# Patient Record
Sex: MALE | Race: WHITE | NOT HISPANIC OR LATINO | Employment: UNEMPLOYED | URBAN - METROPOLITAN AREA
[De-identification: names, ages, dates, MRNs, and addresses within clinical notes are randomized per-mention and may not be internally consistent; named-entity substitution may affect disease eponyms.]

---

## 2024-05-18 ENCOUNTER — HOSPITAL ENCOUNTER (EMERGENCY)
Facility: CLINIC | Age: 24
Discharge: HOME | End: 2024-05-18
Attending: STUDENT IN AN ORGANIZED HEALTH CARE EDUCATION/TRAINING PROGRAM | Admitting: STUDENT IN AN ORGANIZED HEALTH CARE EDUCATION/TRAINING PROGRAM
Payer: COMMERCIAL

## 2024-05-18 ENCOUNTER — APPOINTMENT (OUTPATIENT)
Dept: GENERAL RADIOLOGY | Facility: CLINIC | Age: 24
End: 2024-05-18
Attending: STUDENT IN AN ORGANIZED HEALTH CARE EDUCATION/TRAINING PROGRAM
Payer: COMMERCIAL

## 2024-05-18 VITALS
BODY MASS INDEX: 29.84 KG/M2 | TEMPERATURE: 98 F | WEIGHT: 240 LBS | SYSTOLIC BLOOD PRESSURE: 158 MMHG | DIASTOLIC BLOOD PRESSURE: 84 MMHG | HEART RATE: 78 BPM | HEIGHT: 75 IN | OXYGEN SATURATION: 99 % | RESPIRATION RATE: 18 BRPM

## 2024-05-18 DIAGNOSIS — S61.213A LACERATION OF LEFT MIDDLE FINGER WITHOUT FOREIGN BODY WITHOUT DAMAGE TO NAIL, INITIAL ENCOUNTER: ICD-10-CM

## 2024-05-18 DIAGNOSIS — S61.211A LACERATION OF LEFT INDEX FINGER WITHOUT FOREIGN BODY WITHOUT DAMAGE TO NAIL, INITIAL ENCOUNTER: ICD-10-CM

## 2024-05-18 PROCEDURE — 12002 RPR S/N/AX/GEN/TRNK2.6-7.5CM: CPT

## 2024-05-18 PROCEDURE — 90471 IMMUNIZATION ADMIN: CPT | Performed by: STUDENT IN AN ORGANIZED HEALTH CARE EDUCATION/TRAINING PROGRAM

## 2024-05-18 PROCEDURE — 250N000013 HC RX MED GY IP 250 OP 250 PS 637: Performed by: STUDENT IN AN ORGANIZED HEALTH CARE EDUCATION/TRAINING PROGRAM

## 2024-05-18 PROCEDURE — 90715 TDAP VACCINE 7 YRS/> IM: CPT | Performed by: STUDENT IN AN ORGANIZED HEALTH CARE EDUCATION/TRAINING PROGRAM

## 2024-05-18 PROCEDURE — 99283 EMERGENCY DEPT VISIT LOW MDM: CPT | Mod: 25

## 2024-05-18 PROCEDURE — 250N000011 HC RX IP 250 OP 636: Performed by: STUDENT IN AN ORGANIZED HEALTH CARE EDUCATION/TRAINING PROGRAM

## 2024-05-18 PROCEDURE — 73130 X-RAY EXAM OF HAND: CPT | Mod: LT

## 2024-05-18 RX ORDER — IBUPROFEN 600 MG/1
600 TABLET, FILM COATED ORAL ONCE
Status: COMPLETED | OUTPATIENT
Start: 2024-05-18 | End: 2024-05-18

## 2024-05-18 RX ADMIN — IBUPROFEN 600 MG: 600 TABLET ORAL at 18:02

## 2024-05-18 RX ADMIN — CLOSTRIDIUM TETANI TOXOID ANTIGEN (FORMALDEHYDE INACTIVATED), CORYNEBACTERIUM DIPHTHERIAE TOXOID ANTIGEN (FORMALDEHYDE INACTIVATED), BORDETELLA PERTUSSIS TOXOID ANTIGEN (GLUTARALDEHYDE INACTIVATED), BORDETELLA PERTUSSIS FILAMENTOUS HEMAGGLUTININ ANTIGEN (FORMALDEHYDE INACTIVATED), BORDETELLA PERTUSSIS PERTACTIN ANTIGEN, AND BORDETELLA PERTUSSIS FIMBRIAE 2/3 ANTIGEN 0.5 ML: 5; 2; 2.5; 5; 3; 5 INJECTION, SUSPENSION INTRAMUSCULAR at 18:05

## 2024-05-18 ASSESSMENT — COLUMBIA-SUICIDE SEVERITY RATING SCALE - C-SSRS
2. HAVE YOU ACTUALLY HAD ANY THOUGHTS OF KILLING YOURSELF IN THE PAST MONTH?: NO
6. HAVE YOU EVER DONE ANYTHING, STARTED TO DO ANYTHING, OR PREPARED TO DO ANYTHING TO END YOUR LIFE?: NO
1. IN THE PAST MONTH, HAVE YOU WISHED YOU WERE DEAD OR WISHED YOU COULD GO TO SLEEP AND NOT WAKE UP?: NO

## 2024-05-18 ASSESSMENT — ACTIVITIES OF DAILY LIVING (ADL)
ADLS_ACUITY_SCORE: 35
ADLS_ACUITY_SCORE: 35

## 2024-05-18 NOTE — ED PROVIDER NOTES
"  Emergency Department Note      History of Present Illness     Chief Complaint  Laceration    HPI  Familia Espinoza is a 24 year old male who presents with lacerations to hand.  Patient reports that he was using an electric saw trying to cut down trees at his uncle's home when he accidentally cut into his left hand, lacerating his left index finger and middle finger.  He endorses significant pain at this time.  No other injuries.    Independent Historian  None    Review of External Notes  No immunizations on record  Past Medical History   Medical History and Problem List  No past medical history on file.    Medications  No current outpatient medications on file.      Surgical History   No past surgical history on file.  Physical Exam   Patient Vitals for the past 24 hrs:   BP Temp Temp src Pulse Resp SpO2 Height Weight   05/18/24 1715 (!) 158/84 98  F (36.7  C) Oral 78 18 99 % 1.905 m (6' 3\") 108.9 kg (240 lb)     Physical Exam  General: Alert and cooperative with exam. Patient in no apparent distress. Normal mentation.  Head:  Scalp is NC/AT  Eyes:  EOM intact  ENT:  The external nose and ears are normal.   Neck:  Normal range of motion without rigidity.  CV:  Warm and well perfused  Resp:  Breathing comfortably on room air  MSK:  Lacerations to left index and middle fingers on palmar aspect. Bleeding controlled. Full ROM of digits, able to fully flex and extend against resistance without evidence of tendon involvement.   Skin:  Warm and dry, No rash or lesions noted.  Neuro:  Oriented x 3. No gross motor deficits.    Diagnostics     Imaging  XR Hand Left G/E 3 Views   Final Result   IMPRESSION: Soft tissue injury in the index and long fingers. There is no evidence of fracture or radiopaque foreign body. Otherwise negative.            Independent Interpretation  X-ray left fingers shows no acute fracture or foreign body  ED Course    Medications Administered  Medications   ibuprofen (ADVIL/MOTRIN) tablet 600 mg (600 " mg Oral $Given 5/18/24 1802)   Tdap (tetanus-diphtheria-acell pertussis) (ADACEL) injection 0.5 mL (0.5 mLs Intramuscular $Given 5/18/24 1805)       Procedures      Laceration Repair      Procedure: Laceration Repair    Indication: Laceration    Consent: Verbal    Tetanus status reviewed and requiring update today    Location: Left L third (middle) finger and L second (index) finger    Length: 4 cm    Preparation: Irrigation with Sterile Saline and Pressure device utilized.    Anesthesia/Sedation: Lidocaine - 1%      Treatment/Exploration: Wound explored, no foreign bodies found     Closure: The wound was closed with one layer. Skin/superficial layer was closed with 11 x 5-0 Nylon using Interrupted sutures.     Patient Status: The patient tolerated the procedure well: Yes. There were no complications.      Discussion of Management  None    Social Determinants of Health adding to complexity of care  None    ED Course     Medical Decision Making / Diagnosis   CMS Diagnoses: None    MIPS     None    [facility]  Familia Espinoza is a 24 year old male who presents with lacerations to his left index finger and left middle finger.  Sustained from chainsaw earlier today.  On exam, patient has lacerations noted to the palmar aspect of left index and middle finger over middle phalanxes, both measuring approximately 2 cm in length.  Bleeding is controlled.  Digital block was applied to both digits, wounds were thoroughly cleansed and evaluated.  No evidence of foreign body retained in the wounds.  Patient is able to fully flex and extend at all joints of these digits without evidence of tendon injury.  X-ray was obtained due to the nature of injury and is thankfully negative for acute fracture or foreign body retention.  Lacerations were repaired, see procedural note above.  Patient tolerated the procedures well.  Tetanus does not appear up-to-date, this was provided today.  Also provided patient with ibuprofen for pain.  He will have  to follow-up with his primary care provider or be seen in ER or urgent care for suture removal in 10 to 14 days.  Discussed wound cares for home.  He will return to ER if he develops any signs or symptoms of infection.      Disposition  The patient was discharged.     ICD-10 Codes:    ICD-10-CM    1. Laceration of left index finger without foreign body without damage to nail, initial encounter  S61.211A       2. Laceration of left middle finger without foreign body without damage to nail, initial encounter  S61.213A            Discharge Medications  There are no discharge medications for this patient.        RJ Young Lauren R, PA-C  05/18/24 2206

## 2024-05-18 NOTE — ED TRIAGE NOTES
Pt reports cutting his L index and L middle finger on a saw while doing yard work.      Triage Assessment (Adult)       Row Name 05/18/24 4998          Triage Assessment    Airway WDL WDL        Respiratory WDL    Respiratory WDL WDL        Skin Circulation/Temperature WDL    Skin Circulation/Temperature WDL X  laceration to L index and middle finger.        Cardiac WDL    Cardiac WDL WDL        Peripheral/Neurovascular WDL    Peripheral Neurovascular WDL WDL        Cognitive/Neuro/Behavioral WDL    Cognitive/Neuro/Behavioral WDL WDL

## 2024-05-19 NOTE — DISCHARGE INSTRUCTIONS
Please have sutures removed in 10 to 14 days.  You can return to ER, follow-up in urgent care clinic, or have someone you trust remove these.  Continue to apply bacitracin and bandages daily to wounds.  Continue ibuprofen and Tylenol for symptomatic relief.    If you develop signs or symptoms of infection including increased redness, swelling, drainage from the wounds, return to ER as antibiotics may be needed.    Continue to keep the wounds clean and dry, washing with soap and water.  No submerging hand in standing water including baths, hot tubs, pools.      ??????????, ??????? ??? ????? 10-14 ????.  ?? ?????? ????????? ? ????????? ?????????? ??????, ??????????? ? ??????? ?????????? ?????? ??? ????????? ????-??, ???? ?? ?????????, ??????? ??.  ??????????? ????????? ???????????? ?????????? ? ????? ? ?????.  ??????????? ????????? ????????? ? ???????? ??? ?????????? ?????????.    ???? ? ??? ???????? ???????? ??? ???????? ????????, ??????? ?????????? ???????????, ????, ????????? ?? ???, ????????? ? ????????? ?????????? ??????, ??? ??? ????? ????????????? ???????????.    ??????????? ????????? ???? ? ??????? ? ???????, ???????? ?? ????? ? ?????.  ??? ?????????? ? ????